# Patient Record
(demographics unavailable — no encounter records)

---

## 2024-10-31 NOTE — ADDENDUM
[FreeTextEntry1] : I, Dony Webster, acted as a scribe on behalf of Dr. Ajith Tolentino MD, on 10/30/2024.   All medical entries made by the scribe were at my, Dr. Ajith Tolentino MD, direction and personally dictated by me on 10/30/2024. I have reviewed the chart and agree that the record accurately reflects my personal performance of the history, physical exam, assessment and plan. I have also personally directed, reviewed, and agreed with the chart. Anesthesia Volume In Cc (Will Not Render If 0): 0.5

## 2024-10-31 NOTE — ADDENDUM
[FreeTextEntry1] : I, Dony Webster, acted as a scribe on behalf of Dr. Ajith Tolentino MD, on 10/30/2024.   All medical entries made by the scribe were at my, Dr. Ajith Tolentino MD, direction and personally dictated by me on 10/30/2024. I have reviewed the chart and agree that the record accurately reflects my personal performance of the history, physical exam, assessment and plan. I have also personally directed, reviewed, and agreed with the chart.

## 2024-10-31 NOTE — PHYSICAL EXAM
[Normal] : normal gait, coordination grossly intact, no focal deficits and deep tendon reflexes were 2+ and symmetric [de-identified] : No tenderness to palpation, byrd and neer's test with no tenderness, empty can test positive. Spurling maneuver negative, Tenderness above greater trochanter, discomfort on internal rotation

## 2024-10-31 NOTE — HISTORY OF PRESENT ILLNESS
[Musculoskeletal Symptoms Hips] : hip [Joint Pain, Localized In The Shoulder] : shoulder [Episodic] : episodic [FreeTextEntry8] : Patient is a 57 yr old female who is present today for an acute visit.  Patient c/o of intermittent pains on right side, last few weeks. Reports hip gave out two days ago, fell, when carrying groceries. Reports intermittent pain on right shoulder. Reports waking up at night gasping for air, sometimes during the day. Denies snoring. Reports waking up with headache in the morning. Reports being out of breath walking up staircase today, a little unusual. Denies neck pain, intermittent back pain. Denies numbness and shooting pain. Pt also c/o severe migraines last few weeks. Denies pain when sleeping on right side. Denies any injury to hip through physical activity. Discussed X-Ray of hip. Discussed issue with rotator cuff and tendinitis, discussed Aleve. Reports siblings use cpap. Denies any Hx of panic attacks. Does endorse feelings of needing to nap   Denies any CP, chest tightness or SOB. Denies any abdominal pain, urinary symptom, or change in bowel habits. Denies any fever, chills, or night sweats.

## 2024-10-31 NOTE — HEALTH RISK ASSESSMENT
[Yes] : Yes [No] : In the past 12 months have you used drugs other than those required for medical reasons? No [Never] : Never [de-identified] : occasionally

## 2024-10-31 NOTE — PHYSICAL EXAM
[Normal] : normal gait, coordination grossly intact, no focal deficits and deep tendon reflexes were 2+ and symmetric [de-identified] : No tenderness to palpation, byrd and neer's test with no tenderness, empty can test positive. Spurling maneuver negative, Tenderness above greater trochanter, discomfort on internal rotation

## 2024-10-31 NOTE — ASSESSMENT
[FreeTextEntry1] : Acute visit: Hip pain, Shoulder pain right -BP is stable. Continue current management. - Ordered X- Ray Hip Pelvis, US Extremity Nonvasc, X-Ray Shoulder 2 Views watchpat ordered given the apneic episode and daytime somnolence - RTO in 3 months     Pt verbalized understanding and will reach should any questions/concerns occur.

## 2024-10-31 NOTE — HEALTH RISK ASSESSMENT
[Yes] : Yes [No] : In the past 12 months have you used drugs other than those required for medical reasons? No [Never] : Never [de-identified] : occasionally

## 2025-05-08 NOTE — HISTORY OF PRESENT ILLNESS
[de-identified] : right groin and umbilical pain [de-identified] : This 56 yo F with 20 yrs h/o of right groin pain that "comes and goes" and umbilical pain over the past several years.  The patient states pain occurs when standing for a long period.  when very active and when straining to have a BM. The pain denies swelling, fever, chills, nausea and vomiting or changes in bowel and bladder habits.   Patient states she was evaluated by Dr. Elizabeth (Plastics) since she is thinking of scheduling an Abdominoplasty.  Dr. Elizabeth ordered a CT scan of Ab, which is scheduled this coming Monday 05/12/2025 @ [facility]. Colonoscopy 5 yrs ago, excision benign polyp.

## 2025-05-08 NOTE — REVIEW OF SYSTEMS
[Easy Bruising] : a tendency for easy bruising [Negative] : Endocrine [FreeTextEntry9] : Occasional Back Pain

## 2025-05-08 NOTE — PHYSICAL EXAM
[Normal Breath Sounds] : Normal breath sounds [Normal Heart Sounds] : normal heart sounds [Normal Rate and Rhythm] : normal rate and rhythm [Alert] : alert [Oriented to Person] : oriented to person [Oriented to Place] : oriented to place [Oriented to Time] : oriented to time [Calm] : calm [de-identified] : Well-developed Female in NAD [de-identified] : Normal BS, soft, non-distended, non-tender, palpable small umbilical hernia [de-identified] : Non-palpable groin hernia bilateral [de-identified] : No CVAT, calves soft, non-tender

## 2025-05-08 NOTE — ASSESSMENT
[FreeTextEntry1] : I, Dr. Aaron Clark, personally performed the evaluation and management (E/M) services for this new patient. That E/M includes conducting the clinically appropriate initial history &/or exam, assessing all conditions, and establishing the plan of care. Today, my PA, Ashley Rivas, was here to observe my evaluation and management service for this patient & follow plan of care established by me going forward.

## 2025-05-15 NOTE — ADDENDUM
[FreeTextEntry1] : I, Dony Webster, acted as a scribe on behalf of Dr. Ajith Tolentino MD, on 05/15/2025.   All medical entries made by the scribe were at my, Dr. Ajith Tolentino MD, direction and personally dictated by me on 05/15/2025. I have reviewed the chart and agree that the record accurately reflects my personal performance of the history, physical exam, assessment and plan. I have also personally directed, reviewed, and agreed with the chart.

## 2025-05-15 NOTE — PHYSICAL EXAM
[Normal Breath Sounds] : Normal breath sounds [Normal Heart Sounds] : normal heart sounds [Normal Rate and Rhythm] : normal rate and rhythm [No Rash or Lesion] : No rash or lesion [Calm] : calm [de-identified] : well developed white female in no distress [de-identified] : normal bowel sounds, without distension or tenderness. With a small umbilical hernia. [de-identified] : without palpable hernia or adenopathy [de-identified] : without calf pain or swelling

## 2025-05-15 NOTE — ASSESSMENT
[FreeTextEntry1] : I have discussed with pt that I do not palpate a right inguinal hernia on exam and one is not seen on ct scan. I have discussed that if she develops a lump or swelling in the area she should follow up immediately.  I have discussed the risks, benefits of her umbilical hernia. Pt would like to have it repaired at the time of her abdominoplasty with Dr Elizabeth.

## 2025-05-15 NOTE — HEALTH RISK ASSESSMENT
[Good] : ~his/her~  mood as  good [Yes] : Yes [No] : In the past 12 months have you used drugs other than those required for medical reasons? No [0] : 2) Feeling down, depressed, or hopeless: Not at all (0) [PHQ-2 Negative - No further assessment needed] : PHQ-2 Negative - No further assessment needed [Never] : Never [NO] : No [Patient reported mammogram was normal] : Patient reported mammogram was normal [Patient reported bone density results were abnormal] : Patient reported bone density results were abnormal [FreeTextEntry1] : health maintenance  [Time Spent: ___ Minutes] : I spent [unfilled] minutes performing a depression screening for this patient. [de-identified] : occasionally  [[id number]] : 0 [MammogramDate] : 01/25 [PapSmearComments] : n/a [BoneDensityDate] : 12/22 [BoneDensityComments] : Osteopenia in lumbar spine and both hips. [ColonoscopyComments] : Requires follow up.

## 2025-05-15 NOTE — HISTORY OF PRESENT ILLNESS
[FreeTextEntry1] : Patient is present today to establish care and for a comprehensive Annual Physical Exam. [de-identified] : Patient is a 57yr old female who is present today to establish care and for a comprehensive Annual Physical Exam.  Patient is doing well overall. Patient reports she experiences pain with hernia on right side of groin which fills up with fluid and radiates down right thigh. Reports pain presents more when she stands for extended periods of time. Reports doing CT scan of abdomen recently for surveillance of possible hernia, showed cyst in kidney. Confirms Mammo UTD and normal. Follows with medications as prescribed. Has also been following with hormone therapy with GYN, confirms improvement with mood and brain fog. Due for colonoscopy, discussed referral. Shingles vaccine UTD. Pt also requests surveillance of Hepatitis and Measles titers. Also requests check for Lyme Disease in bloodwork. Denies anxiety and depression.   Pt also reports using Olopatadine for eyes, requests Flonase from PCP.  Denies any CP, chest tightness or SOB. Denies any abdominal pain, urinary symptom, or change in bowel habits. Denies any fever, chills, or night sweats.

## 2025-05-15 NOTE — ASSESSMENT
[Vaccines Reviewed] : Immunizations reviewed today. Please see immunization details in the vaccine log within the immunization flowsheet.  [FreeTextEntry1] :  Annual Physical Exam: Allergic conjunctivitis of both eyes, Screening for colon cancer, Measles, Risk of exposure to Lyme Disease, Primary Hypothyroidism, Nasal congestion - BP is stable. Continue current management. - Check A1c, Borrelia burgdorferi IgG/IgM Antibody, CBC, CMP, Hepatitis B Surface AB Quantitative, Lipid profile, Rubeola Virus IgG Antibody, Vitamin levels, Urinalysis, TSH - Start Fluticasone Propionate 50 MCG/ACT Nasal Suspension, CVS Olopatadine HCl - 0.1 % Ophthalmic Solution - Order US Thyroid  - GI Referral   - RTO annually or as needed.   Pt verbalized understanding and will reach should any questions/concerns occur.

## 2025-05-15 NOTE — HISTORY OF PRESENT ILLNESS
[de-identified] :  right groin and umbilical pain.    [de-identified] : Pt is here to go over her ct scan. I have discussed with pt that her ct scan shows a small umbilical hernia and no evidence of a right inguinal hernia. No other pathology identified.

## 2025-05-15 NOTE — PHYSICAL EXAM
[Normal Breath Sounds] : Normal breath sounds [Normal Heart Sounds] : normal heart sounds [Normal Rate and Rhythm] : normal rate and rhythm [No Rash or Lesion] : No rash or lesion [Calm] : calm [de-identified] : well developed white female in no distress [de-identified] : normal bowel sounds, without distension or tenderness. With a small umbilical hernia. [de-identified] : without palpable hernia or adenopathy [de-identified] : without calf pain or swelling

## 2025-05-15 NOTE — HISTORY OF PRESENT ILLNESS
[de-identified] :  right groin and umbilical pain.    [de-identified] : Pt is here to go over her ct scan. I have discussed with pt that her ct scan shows a small umbilical hernia and no evidence of a right inguinal hernia. No other pathology identified.